# Patient Record
Sex: MALE | Race: WHITE | Employment: UNEMPLOYED | URBAN - METROPOLITAN AREA
[De-identification: names, ages, dates, MRNs, and addresses within clinical notes are randomized per-mention and may not be internally consistent; named-entity substitution may affect disease eponyms.]

---

## 2022-07-18 ENCOUNTER — APPOINTMENT (OUTPATIENT)
Dept: GENERAL RADIOLOGY | Age: 42
End: 2022-07-18

## 2022-07-18 ENCOUNTER — HOSPITAL ENCOUNTER (EMERGENCY)
Age: 42
Discharge: HOME | End: 2022-07-18
Attending: EMERGENCY MEDICINE

## 2022-07-18 VITALS
HEART RATE: 87 BPM | DIASTOLIC BLOOD PRESSURE: 95 MMHG | RESPIRATION RATE: 19 BRPM | SYSTOLIC BLOOD PRESSURE: 140 MMHG | OXYGEN SATURATION: 97 % | TEMPERATURE: 98.4 F

## 2022-07-18 DIAGNOSIS — R07.89 ATYPICAL CHEST PAIN: ICD-10-CM

## 2022-07-18 DIAGNOSIS — L01.00 IMPETIGO: Primary | ICD-10-CM

## 2022-07-18 DIAGNOSIS — F15.10 METHAMPHETAMINE USE (HCC): ICD-10-CM

## 2022-07-18 DIAGNOSIS — F42.4 SKIN PICKING HABIT: ICD-10-CM

## 2022-07-18 LAB
ANION GAP SERPL CALCULATED.3IONS-SCNC: 9 MMOL/L (ref 3–16)
BASE EXCESS VENOUS: 2.7 MMOL/L (ref -2–3)
BASOPHILS ABSOLUTE: 0 K/UL (ref 0–0.2)
BASOPHILS RELATIVE PERCENT: 0.5 %
BUN BLDV-MCNC: 21 MG/DL (ref 7–20)
CALCIUM SERPL-MCNC: 9.2 MG/DL (ref 8.3–10.6)
CARBOXYHEMOGLOBIN: 3.3 % (ref 0–1.5)
CHLORIDE BLD-SCNC: 102 MMOL/L (ref 99–110)
CO2: 26 MMOL/L (ref 21–32)
CREAT SERPL-MCNC: 1 MG/DL (ref 0.9–1.3)
EKG ATRIAL RATE: 81 BPM
EKG DIAGNOSIS: NORMAL
EKG P AXIS: 71 DEGREES
EKG P-R INTERVAL: 134 MS
EKG Q-T INTERVAL: 366 MS
EKG QRS DURATION: 96 MS
EKG QTC CALCULATION (BAZETT): 425 MS
EKG R AXIS: 20 DEGREES
EKG T AXIS: 44 DEGREES
EKG VENTRICULAR RATE: 81 BPM
EOSINOPHILS ABSOLUTE: 0.1 K/UL (ref 0–0.6)
EOSINOPHILS RELATIVE PERCENT: 2 %
GFR AFRICAN AMERICAN: >60
GFR NON-AFRICAN AMERICAN: >60
GLUCOSE BLD-MCNC: 101 MG/DL (ref 70–99)
HCO3 VENOUS: 29.1 MMOL/L (ref 24–28)
HCT VFR BLD CALC: 43.8 % (ref 40.5–52.5)
HEMOGLOBIN, VEN, REDUCED: 26.2 %
HEMOGLOBIN: 14.5 G/DL (ref 13.5–17.5)
LYMPHOCYTES ABSOLUTE: 1.7 K/UL (ref 1–5.1)
LYMPHOCYTES RELATIVE PERCENT: 25.8 %
MCH RBC QN AUTO: 31.7 PG (ref 26–34)
MCHC RBC AUTO-ENTMCNC: 33.2 G/DL (ref 31–36)
MCV RBC AUTO: 95.5 FL (ref 80–100)
METHEMOGLOBIN VENOUS: 0.3 % (ref 0–1.5)
MONOCYTES ABSOLUTE: 0.6 K/UL (ref 0–1.3)
MONOCYTES RELATIVE PERCENT: 8.7 %
NEUTROPHILS ABSOLUTE: 4.1 K/UL (ref 1.7–7.7)
NEUTROPHILS RELATIVE PERCENT: 63 %
O2 SAT, VEN: 73 %
PCO2, VEN: 50 MMHG (ref 41–51)
PDW BLD-RTO: 13.8 % (ref 12.4–15.4)
PH VENOUS: 7.37 (ref 7.35–7.45)
PLATELET # BLD: 291 K/UL (ref 135–450)
PMV BLD AUTO: 7.3 FL (ref 5–10.5)
PO2, VEN: 38.7 MMHG (ref 25–40)
POTASSIUM REFLEX MAGNESIUM: 4.1 MMOL/L (ref 3.5–5.1)
PRO-BNP: 55 PG/ML (ref 0–124)
RBC # BLD: 4.58 M/UL (ref 4.2–5.9)
SODIUM BLD-SCNC: 137 MMOL/L (ref 136–145)
TCO2 CALC VENOUS: 31 MMOL/L
TROPONIN: <0.01 NG/ML
WBC # BLD: 6.5 K/UL (ref 4–11)

## 2022-07-18 PROCEDURE — 71046 X-RAY EXAM CHEST 2 VIEWS: CPT

## 2022-07-18 PROCEDURE — 99285 EMERGENCY DEPT VISIT HI MDM: CPT

## 2022-07-18 PROCEDURE — 84484 ASSAY OF TROPONIN QUANT: CPT

## 2022-07-18 PROCEDURE — 80048 BASIC METABOLIC PNL TOTAL CA: CPT

## 2022-07-18 PROCEDURE — 82803 BLOOD GASES ANY COMBINATION: CPT

## 2022-07-18 PROCEDURE — 83880 ASSAY OF NATRIURETIC PEPTIDE: CPT

## 2022-07-18 PROCEDURE — 93005 ELECTROCARDIOGRAM TRACING: CPT | Performed by: PHYSICIAN ASSISTANT

## 2022-07-18 PROCEDURE — 85025 COMPLETE CBC W/AUTO DIFF WBC: CPT

## 2022-07-18 PROCEDURE — 36415 COLL VENOUS BLD VENIPUNCTURE: CPT

## 2022-07-18 RX ORDER — DOXYCYCLINE HYCLATE 100 MG
100 TABLET ORAL 2 TIMES DAILY
Qty: 14 TABLET | Refills: 0 | Status: SHIPPED | OUTPATIENT
Start: 2022-07-18 | End: 2022-07-25

## 2022-07-18 RX ORDER — CEPHALEXIN 500 MG/1
500 CAPSULE ORAL 4 TIMES DAILY
Qty: 28 CAPSULE | Refills: 0 | Status: SHIPPED | OUTPATIENT
Start: 2022-07-18

## 2022-07-18 ASSESSMENT — ENCOUNTER SYMPTOMS
CHEST TIGHTNESS: 0
NAUSEA: 0
CONSTIPATION: 0
WHEEZING: 0
DIARRHEA: 0
ABDOMINAL PAIN: 0
COUGH: 0
VOMITING: 0
SHORTNESS OF BREATH: 0
EYES NEGATIVE: 1

## 2022-07-18 ASSESSMENT — HEART SCORE: ECG: 0

## 2022-07-18 NOTE — ED PROVIDER NOTES
[facility] ENCOUNTER          PHYSICIAN ASSISTANT NOTE       Date of evaluation: 7/18/2022    Chief Complaint     Chest Pain    History of Present Illness     Jorje Rodriguez is a 39 y.o. male who presents to the emergency department with a chief complaint of chest pain. The patient states that for the past 1 week he has had left mid chest pain which he describes as shooting pains which radiate down the left arm. Patient states that he has a history of IVDU and recently relapsed approximately 1 week ago on methamphetamines which he injected. He states that he has a history of endocarditis and had a valve replacement a few years back. The patient states that over the past month or so he has been dealing with some wounds which are not healing very well for work when he banged his left lower extremity on a ladder. He states that approximately 2 weeks ago he woke up with a lesion in the corner of his left mouth as well as 1 on the right ear for which she has been using mupirocin ointment on. He states that he has a history of poor wound healing with MRSA infections. He states that he currently is not having any withdrawal symptoms and has \"gotten himself back into control. \"  He states that he has noticed some swelling in his lower extremities and he is concerned that he has recurrent endocarditis. No fevers or chills. No associated shortness of breath. No abdominal pain. Review of Systems     Review of Systems   Constitutional:  Positive for fatigue. Negative for chills, diaphoresis and fever. HENT: Negative. Eyes: Negative. Respiratory:  Negative for cough, chest tightness, shortness of breath and wheezing. Cardiovascular:  Positive for chest pain and leg swelling. Negative for palpitations. Gastrointestinal:  Negative for abdominal pain, constipation, diarrhea, nausea and vomiting. Genitourinary:  Negative for frequency and urgency.    Musculoskeletal: Negative. Skin:  Positive for wound. Neurological:  Negative for dizziness and headaches. Hematological:  Negative for adenopathy. Psychiatric/Behavioral: Negative. All other systems reviewed and are negative. Past Medical, Surgical, Family, and Social History     He has no past medical history on file. He has no past surgical history on file. His family history is not on file. He reports that he has been smoking cigarettes. He has been smoking an average of .5 packs per day. He has never used smokeless tobacco. He reports that he does not drink alcohol and does not use drugs. Medications     Discharge Medication List as of 7/18/2022  2:28 PM        CONTINUE these medications which have NOT CHANGED    Details   raNITIdine (ZANTAC) 150 MG tablet Take 1 tablet by mouth 2 times daily for 21 days, Disp-42 tablet, R-0Print           Allergies     He has No Known Allergies. Physical Exam     INITIAL VITALS: BP: (!) 140/95, Temp: 98.4 °F (36.9 °C), Heart Rate: 87, Resp: 19, SpO2: 97 %  Physical Exam  Vitals and nursing note reviewed. Constitutional:       General: He is not in acute distress. Appearance: Normal appearance. He is well-developed and normal weight. He is not diaphoretic. HENT:      Head: Normocephalic and atraumatic. Nose: Nose normal.      Mouth/Throat:      Mouth: Mucous membranes are moist.      Pharynx: Oropharynx is clear. Eyes:      Conjunctiva/sclera: Conjunctivae normal.      Pupils: Pupils are equal, round, and reactive to light. Cardiovascular:      Rate and Rhythm: Normal rate and regular rhythm. Pulses: Normal pulses. Heart sounds: Normal heart sounds. No murmur heard. No friction rub. No gallop. Pulmonary:      Effort: Pulmonary effort is normal. No respiratory distress. Breath sounds: Normal breath sounds. No wheezing, rhonchi or rales. Chest:      Chest wall: No tenderness.    Abdominal:      General: Bowel sounds are normal. There is no distension. Palpations: Abdomen is soft. Tenderness: There is no abdominal tenderness. There is no right CVA tenderness, left CVA tenderness, guarding or rebound. Musculoskeletal:         General: No tenderness or deformity. Normal range of motion. Cervical back: Normal range of motion and neck supple. Lymphadenopathy:      Cervical: No cervical adenopathy. Skin:     General: Skin is warm and dry. Coloration: Skin is not pale. Findings: Lesion present. No rash. Comments: There are multiple poor healing wounds on the patient's anterior bilateral lower extremities. There is a lesion in the corner of the patient's left mouth which is honey crusted in appearance. There is a very similar lesion on the patient's right earlobe which also appears to be honey crusted. Neurological:      Mental Status: He is alert and oriented to person, place, and time. Cranial Nerves: No cranial nerve deficit. Psychiatric:         Mood and Affect: Mood normal.         Behavior: Behavior normal.      Comments: Anxious appearing gentleman. Diagnostic Results     EKG   Interpreted in conjunction with emergency department physician Natalee Gomez MD  Rhythm: normal sinus   Rate: normal  Axis: normal  : none  Conduction: right bundle branch block (incomplete)  ST Segments: normal  T Waves: normal  Q Waves:none  Clinical Impression: non-specific EKG  Comparison:  NSR with left atrial enlargement w/ incomplete RBBB    RADIOLOGY:  XR CHEST (2 VW)   Final Result   Impression: Interval sternotomy. No acute pulmonary abnormality.         LABS:   Results for orders placed or performed during the hospital encounter of 07/18/22   CBC with Auto Differential   Result Value Ref Range    WBC 6.5 4.0 - 11.0 K/uL    RBC 4.58 4.20 - 5.90 M/uL    Hemoglobin 14.5 13.5 - 17.5 g/dL    Hematocrit 43.8 40.5 - 52.5 %    MCV 95.5 80.0 - 100.0 fL    MCH 31.7 26.0 - 34.0 pg    MCHC 33.2 31.0 - 36.0 g/dL RDW 13.8 12.4 - 15.4 %    Platelets 195 254 - 449 K/uL    MPV 7.3 5.0 - 10.5 fL    Neutrophils % 63.0 %    Lymphocytes % 25.8 %    Monocytes % 8.7 %    Eosinophils % 2.0 %    Basophils % 0.5 %    Neutrophils Absolute 4.1 1.7 - 7.7 K/uL    Lymphocytes Absolute 1.7 1.0 - 5.1 K/uL    Monocytes Absolute 0.6 0.0 - 1.3 K/uL    Eosinophils Absolute 0.1 0.0 - 0.6 K/uL    Basophils Absolute 0.0 0.0 - 0.2 K/uL   Basic Metabolic Panel w/ Reflex to MG   Result Value Ref Range    Sodium 137 136 - 145 mmol/L    Potassium reflex Magnesium 4.1 3.5 - 5.1 mmol/L    Chloride 102 99 - 110 mmol/L    CO2 26 21 - 32 mmol/L    Anion Gap 9 3 - 16    Glucose 101 (H) 70 - 99 mg/dL    BUN 21 (H) 7 - 20 mg/dL    CREATININE 1.0 0.9 - 1.3 mg/dL    GFR Non-African American >60 >60    GFR African American >60 >60    Calcium 9.2 8.3 - 10.6 mg/dL   Brain Natriuretic Peptide   Result Value Ref Range    Pro-BNP 55 0 - 124 pg/mL   Blood Gas, Venous   Result Value Ref Range    pH, Ugo 7.372 7.350 - 7.450    pCO2, Ugo 50.0 41.0 - 51.0 mmHg    pO2, Ugo 38.7 25.0 - 40.0 mmHg    HCO3, Venous 29.1 (H) 24.0 - 28.0 mmol/L    Base Excess, Ugo 2.7 -2.0 - 3.0 mmol/L    O2 Sat, Ugo 73 Not established %    Carboxyhemoglobin 3.3 (H) 0.0 - 1.5 %    MetHgb, Ugo 0.3 0.0 - 1.5 %    TC02 (Calc), Ugo 31 mmol/L    Hemoglobin, Ugo, Reduced 26.20 %   Troponin   Result Value Ref Range    Troponin <0.01 <0.01 ng/mL   EKG 12 Lead   Result Value Ref Range    Ventricular Rate 81 BPM    Atrial Rate 81 BPM    P-R Interval 134 ms    QRS Duration 96 ms    Q-T Interval 366 ms    QTc Calculation (Bazett) 425 ms    P Axis 71 degrees    R Axis 20 degrees    T Axis 44 degrees    Diagnosis       EKG performed in ER and to be interpreted by ER physician. Confirmed by MD, ER ([id number]),  [address], [address] ([id number]) on 7/18/2022 12:40:35 PM       ED BEDSIDE ULTRASOUND:  No results found.     RECENT VITALS:  BP: (!) 140/95, Temp: 98.4 °F (36.9 °C), Heart Rate: 87, Resp: 19, SpO2: 97 % Procedures     None    ED Course     Nursing Notes, Past Medical Hx,Past Surgical Hx, Social Hx, Allergies, and Family Hx were reviewed. The patient was given the following medications:  Orders Placed This Encounter   Medications    cephALEXin (KEFLEX) 500 MG capsule     Sig: Take 1 capsule by mouth in the morning and 1 capsule at noon and 1 capsule in the evening and 1 capsule before bedtime. Dispense:  28 capsule     Refill:  0    doxycycline hyclate (VIBRA-TABS) 100 MG tablet     Sig: Take 1 tablet by mouth in the morning and 1 tablet before bedtime. Do all this for 7 days. Dispense:  14 tablet     Refill:  0         CONSULTS:  None    MEDICAL DECISION MAKING / ASSESSMENT / Cooper Domínguez is a 39 y.o. male who presents emergency department with a chief complaint of chest pain. On my exam I see nontoxic-appearing young gentleman who is afebrile without tachycardia, tachypnea or hypoxia. His blood pressure is 140/95. Lungs are clear to auscultation bilaterally. Cardiac examination revealed regular rate and rhythm with no murmurs, rubs or gallops. Abdomen is soft and nontender. The patient has multiple abrasions to the bilateral lower extremities which appear to be in different healing phases. He has a lesion on the corner of his left lower mouth which is honey crusted as well as a lesion on the pinna of the right ear which appears very similar and likely consistent with impetigo. The patient is very concerned that he has endocarditis which previously required open sternotomy with valve replacement. Overall, his work-up today was very reassuring. No signs of any leukocytosis on CBC. VBG reassuring. Troponin less than 0.01. BNP without elevation. His BMP showed a glucose of 101 and a BUN of 21. Based on his physical examination and laboratory work-up I do not feel that this patient has endocarditis.   I cannot appreciate a murmur on my exam. His CXR is unremarkable besides previous sternotomy. I have a very low suspicion for ACS in this patient given his atypical presentation of pain that has been going on for at least the past 1 week. No recent infection to suggest pericarditis. No associated shortness of breath to suggest COPD exacerbation. No pleuritic chest pain, tachycardia, tachypnea or hypoxia to suggest pulmonary embolism. No trauma or blow to the chest to suggest a pneumothorax. As I do not know exactly what is causing the patient's shooting pain in his chest, I do not feel that it is emergent. He is also describing multiple other atypical symptoms such as random, intermittent numbness of different areas in his body which last for seconds and then completely resolved. It is possible that his atypical chest pain could be musculoskeletal.    The patient seems very reassured by this. I do think that he deems treatment for his honey crusted lesions which have not been responding well to mupirocin. Given his possible MRSA infections in the past I will place the patient on double coverage Keflex plus doxycycline. He tells me that Bactrim does not work well for him. The patient is very comfortable with this plan. He seems reassured and will follow up with a primary care provider. He denies any other needs at this time. This patient was also evaluated by the attending physician. All care plans were discussed and agreed upon. Clinical Impression     1. Impetigo    2. Atypical chest pain    3. Methamphetamine use ([address]    4. Skin picking habit      Disposition     PATIENT REFERRED TO:  No follow-up provider specified. DISCHARGE MEDICATIONS:  Discharge Medication List as of 7/18/2022  2:28 PM        START taking these medications    Details   cephALEXin (KEFLEX) 500 MG capsule Take 1 capsule by mouth in the morning and 1 capsule at noon and 1 capsule in the evening and 1 capsule before bedtime. , Disp-28 capsule, R-0Normal      doxycycline hyclate (VIBRA-TABS) 100 MG tablet Take 1 tablet by mouth in the morning and 1 tablet before bedtime. Do all this for 7 days. , Disp-14 tablet, R-0Normal             DISPOSITION Decision To Discharge 07/18/2022 02:23:56 PM     Annia Miranda PA-C  07/18/22 [id number]

## 2022-07-18 NOTE — DISCHARGE INSTRUCTIONS
You are seen in the emergency department for chest pain. Work-up was reassuring today. For your skin, I feel that this is impetigo, stop using the topical ointments and start taking these oral antibiotics. These were sent to your pharmacy of choice. You need to take them as prescribed for the lesions to go away completely. Return to the emergency department with fever, chills, shortness of breath, any other concerns you may have.

## 2022-07-18 NOTE — ED PROVIDER NOTES
ED Attending Attestation Note     Date of evaluation: 7/18/2022    This patient was seen by the advance practice provider. I have seen and examined the patient, agree with the workup, evaluation, management and diagnosis. The care plan has been discussed. I have reviewed the ECG and concur with the DIONNE's interpretation. My assessment reveals patient here with chest pain and h/o meth use and endocarditis. Pain is described as a shooting pain starting in the sternal area and going down into the axilla and down his arm that comes and goes, almost like lightning. Afebrile here with no murmur. Did have multiple different skin lesions and some pustules on his left cheek and right earlobe most likely consistent with impetigo. With normal white blood cell count and other lab, is felt the patient can be discharged home on oral antibiotics. Chest pain does not sound to be cardiac in nature and troponin is undetectable with no concerning EKG findings.      Nan Hopson MD  07/18/22 [id number]